# Patient Record
Sex: FEMALE | Race: OTHER | HISPANIC OR LATINO | URBAN - METROPOLITAN AREA
[De-identification: names, ages, dates, MRNs, and addresses within clinical notes are randomized per-mention and may not be internally consistent; named-entity substitution may affect disease eponyms.]

---

## 2023-05-08 ENCOUNTER — OFFICE (OUTPATIENT)
Dept: URBAN - METROPOLITAN AREA CLINIC 12 | Facility: CLINIC | Age: 68
Setting detail: OPHTHALMOLOGY
End: 2023-05-08
Payer: COMMERCIAL

## 2023-05-08 DIAGNOSIS — Z96.1: ICD-10-CM

## 2023-05-08 DIAGNOSIS — H26.493: ICD-10-CM

## 2023-05-08 DIAGNOSIS — H40.033: ICD-10-CM

## 2023-05-08 DIAGNOSIS — H43.393: ICD-10-CM

## 2023-05-08 DIAGNOSIS — E11.9: ICD-10-CM

## 2023-05-08 DIAGNOSIS — H18.413: ICD-10-CM

## 2023-05-08 DIAGNOSIS — H16.223: ICD-10-CM

## 2023-05-08 PROCEDURE — 92250 FUNDUS PHOTOGRAPHY W/I&R: CPT | Performed by: OPHTHALMOLOGY

## 2023-05-08 PROCEDURE — 99214 OFFICE O/P EST MOD 30 MIN: CPT | Performed by: OPHTHALMOLOGY

## 2023-05-08 ASSESSMENT — CONFRONTATIONAL VISUAL FIELD TEST (CVF)
OD_FINDINGS: FULL
OS_FINDINGS: FULL

## 2023-05-08 ASSESSMENT — TONOMETRY
OD_IOP_MMHG: 18
OS_IOP_MMHG: 17

## 2023-05-08 ASSESSMENT — SUPERFICIAL PUNCTATE KERATITIS (SPK)
OS_SPK: 1+ 2+
OD_SPK: 1+ 2+

## 2023-05-09 ASSESSMENT — REFRACTION_MANIFEST
OS_ADD: +2.50
OD_VA1: 20/20
OD_AXIS: 060
OS_CYLINDER: -0.50
OS_CYLINDER: -0.75
OD_ADD: +2.50
OS_AXIS: 125
OS_VA1: 20/25-2
OS_VA1: 20/20
OS_ADD: +2.50
OD_CYLINDER: -0.50
OD_AXIS: 080
OD_SPHERE: +0.50
OS_SPHERE: PL
OD_SPHERE: +0.50
OS_AXIS: 145
OD_ADD: +2.50
OD_CYLINDER: -0.50
OS_SPHERE: +0.25

## 2023-05-09 ASSESSMENT — REFRACTION_CURRENTRX
OS_AXIS: 139
OD_SPHERE: +0.50
OD_OVR_VA: 20/
OS_CYLINDER: -1.00
OS_VPRISM_DIRECTION: PROGS
OD_CYLINDER: -0.50
OD_VPRISM_DIRECTION: PROGS
OS_ADD: +2.50
OD_ADD: +2.50
OS_SPHERE: PLANO
OS_OVR_VA: 20/
OD_AXIS: 065

## 2023-05-09 ASSESSMENT — KERATOMETRY
OD_K1POWER_DIOPTERS: 43.50
OD_K2POWER_DIOPTERS: 43.75
OS_AXISANGLE_DEGREES: 077
OD_AXISANGLE_DEGREES: 114
OS_K2POWER_DIOPTERS: 44.00
OS_K1POWER_DIOPTERS: 43.25
METHOD_AUTO_MANUAL: AUTO

## 2023-05-09 ASSESSMENT — VISUAL ACUITY
OS_BCVA: 20/25
OD_BCVA: 20/30-1

## 2023-05-09 ASSESSMENT — SPHEQUIV_DERIVED
OD_SPHEQUIV: 0.375
OS_SPHEQUIV: 0
OD_SPHEQUIV: 0.25
OS_SPHEQUIV: 0.25
OD_SPHEQUIV: 0.25

## 2023-05-09 ASSESSMENT — REFRACTION_AUTOREFRACTION
OD_SPHERE: +0.75
OS_CYLINDER: -0.50
OD_AXIS: 081
OS_SPHERE: +0.50
OD_CYLINDER: -0.75
OS_AXIS: 126

## 2023-05-09 ASSESSMENT — AXIALLENGTH_DERIVED
OD_AL: 23.4495
OD_AL: 23.4014
OS_AL: 23.4495
OD_AL: 23.4495
OS_AL: 23.5461

## 2023-06-02 ENCOUNTER — ASC (OUTPATIENT)
Dept: URBAN - METROPOLITAN AREA SURGERY 8 | Facility: SURGERY | Age: 68
Setting detail: OPHTHALMOLOGY
End: 2023-06-02
Payer: COMMERCIAL

## 2023-06-02 DIAGNOSIS — H26.492: ICD-10-CM

## 2023-06-02 PROCEDURE — 66821 AFTER CATARACT LASER SURGERY: CPT | Performed by: OPHTHALMOLOGY

## 2023-06-02 ASSESSMENT — REFRACTION_MANIFEST
OD_ADD: +2.50
OD_AXIS: 060
OS_ADD: +2.50
OS_AXIS: 145
OD_VA1: 20/20
OD_CYLINDER: -0.50
OD_ADD: +2.50
OD_SPHERE: +0.50
OS_CYLINDER: -0.75
OS_SPHERE: +0.25
OD_SPHERE: +0.50
OS_SPHERE: PL
OS_VA1: 20/25-2
OS_AXIS: 125
OS_VA1: 20/20
OS_CYLINDER: -0.50
OD_CYLINDER: -0.50
OD_AXIS: 080
OS_ADD: +2.50

## 2023-06-02 ASSESSMENT — REFRACTION_CURRENTRX
OD_VPRISM_DIRECTION: PROGS
OS_CYLINDER: -1.00
OD_SPHERE: +0.50
OS_VPRISM_DIRECTION: PROGS
OS_ADD: +2.50
OD_OVR_VA: 20/
OS_OVR_VA: 20/
OD_CYLINDER: -0.50
OS_AXIS: 139
OD_ADD: +2.50
OD_AXIS: 065
OS_SPHERE: PLANO

## 2023-06-02 ASSESSMENT — REFRACTION_AUTOREFRACTION
OD_AXIS: 081
OS_SPHERE: +0.50
OS_AXIS: 126
OS_CYLINDER: -0.50
OD_CYLINDER: -0.75
OD_SPHERE: +0.75

## 2023-06-02 ASSESSMENT — KERATOMETRY
OS_K2POWER_DIOPTERS: 44.00
OD_K1POWER_DIOPTERS: 43.50
OS_AXISANGLE_DEGREES: 077
OD_AXISANGLE_DEGREES: 114
METHOD_AUTO_MANUAL: AUTO
OD_K2POWER_DIOPTERS: 43.75
OS_K1POWER_DIOPTERS: 43.25

## 2023-06-02 ASSESSMENT — SPHEQUIV_DERIVED
OS_SPHEQUIV: 0.25
OD_SPHEQUIV: 0.25
OD_SPHEQUIV: 0.375
OS_SPHEQUIV: 0
OD_SPHEQUIV: 0.25

## 2023-06-02 ASSESSMENT — AXIALLENGTH_DERIVED
OS_AL: 23.4495
OD_AL: 23.4495
OD_AL: 23.4495
OD_AL: 23.4014
OS_AL: 23.5461

## 2023-06-02 ASSESSMENT — VISUAL ACUITY
OD_BCVA: 20/30-1
OS_BCVA: 20/25

## 2023-06-02 ASSESSMENT — SUPERFICIAL PUNCTATE KERATITIS (SPK)
OS_SPK: 1+ 2+
OD_SPK: 1+ 2+

## 2023-06-02 ASSESSMENT — CONFRONTATIONAL VISUAL FIELD TEST (CVF)
OD_FINDINGS: FULL
OS_FINDINGS: FULL

## 2023-06-16 ENCOUNTER — AMBULATORY SURGERY (OUTPATIENT)
Dept: URBAN - METROPOLITAN AREA SURGERY 2 | Facility: SURGERY | Age: 68
Setting detail: OPHTHALMOLOGY
End: 2023-06-16
Payer: COMMERCIAL

## 2023-06-16 ENCOUNTER — RX ONLY (RX ONLY)
Age: 68
End: 2023-06-16

## 2023-06-16 DIAGNOSIS — H26.491: ICD-10-CM

## 2023-06-16 PROCEDURE — 66821 AFTER CATARACT LASER SURGERY: CPT | Performed by: OPHTHALMOLOGY

## 2023-06-16 ASSESSMENT — REFRACTION_AUTOREFRACTION
OD_CYLINDER: -0.75
OS_AXIS: 126
OS_SPHERE: +0.50
OS_CYLINDER: -0.50
OD_AXIS: 081
OD_SPHERE: +0.75

## 2023-06-16 ASSESSMENT — REFRACTION_MANIFEST
OS_ADD: +2.50
OS_SPHERE: PL
OS_VA1: 20/25-2
OD_SPHERE: +0.50
OD_ADD: +2.50
OD_SPHERE: +0.50
OS_CYLINDER: -0.50
OD_AXIS: 080
OS_SPHERE: +0.25
OS_VA1: 20/20
OD_CYLINDER: -0.50
OD_VA1: 20/20
OS_AXIS: 125
OS_CYLINDER: -0.75
OS_ADD: +2.50
OD_CYLINDER: -0.50
OD_ADD: +2.50
OS_AXIS: 145
OD_AXIS: 060

## 2023-06-16 ASSESSMENT — REFRACTION_CURRENTRX
OD_CYLINDER: -0.50
OS_OVR_VA: 20/
OS_ADD: +2.50
OD_OVR_VA: 20/
OS_CYLINDER: -1.00
OD_VPRISM_DIRECTION: PROGS
OD_SPHERE: +0.50
OS_SPHERE: PLANO
OD_ADD: +2.50
OD_AXIS: 065
OS_AXIS: 139
OS_VPRISM_DIRECTION: PROGS

## 2023-06-16 ASSESSMENT — CONFRONTATIONAL VISUAL FIELD TEST (CVF)
OD_FINDINGS: FULL
OS_FINDINGS: FULL

## 2023-06-16 ASSESSMENT — SPHEQUIV_DERIVED
OS_SPHEQUIV: 0.25
OD_SPHEQUIV: 0.25
OD_SPHEQUIV: 0.375
OS_SPHEQUIV: 0
OD_SPHEQUIV: 0.25

## 2023-06-16 ASSESSMENT — KERATOMETRY
OS_K1POWER_DIOPTERS: 43.25
OS_K2POWER_DIOPTERS: 44.00
OD_AXISANGLE_DEGREES: 114
OS_AXISANGLE_DEGREES: 077
METHOD_AUTO_MANUAL: AUTO
OD_K1POWER_DIOPTERS: 43.50
OD_K2POWER_DIOPTERS: 43.75

## 2023-06-16 ASSESSMENT — AXIALLENGTH_DERIVED
OD_AL: 23.4014
OS_AL: 23.5461
OD_AL: 23.4495
OD_AL: 23.4495
OS_AL: 23.4495

## 2023-06-16 ASSESSMENT — VISUAL ACUITY
OD_BCVA: 20/30-1
OS_BCVA: 20/25

## 2023-06-16 ASSESSMENT — SUPERFICIAL PUNCTATE KERATITIS (SPK)
OS_SPK: 1+ 2+
OD_SPK: 1+ 2+

## 2023-07-03 ENCOUNTER — OFFICE (OUTPATIENT)
Dept: URBAN - METROPOLITAN AREA CLINIC 12 | Facility: CLINIC | Age: 68
Setting detail: OPHTHALMOLOGY
End: 2023-07-03
Payer: COMMERCIAL

## 2023-07-03 DIAGNOSIS — H26.491: ICD-10-CM

## 2023-07-03 PROBLEM — H26.493 POSTERIOR CAPSULAR OPACIFICATION; BOTH EYES: Status: ACTIVE | Noted: 2023-07-03

## 2023-07-03 PROCEDURE — 99024 POSTOP FOLLOW-UP VISIT: CPT | Performed by: OPTOMETRIST

## 2023-07-03 ASSESSMENT — REFRACTION_MANIFEST
OS_AXIS: 145
OS_AXIS: 125
OD_ADD: +2.50
OS_ADD: +2.50
OS_SPHERE: +0.25
OD_CYLINDER: -0.50
OS_CYLINDER: -0.75
OD_CYLINDER: -0.50
OD_SPHERE: +0.50
OS_VA1: 20/25-2
OD_AXIS: 060
OS_SPHERE: PL
OD_AXIS: 080
OD_SPHERE: +0.50
OD_VA1: 20/20
OS_CYLINDER: -0.50
OS_VA1: 20/20
OS_ADD: +2.50
OD_ADD: +2.50

## 2023-07-03 ASSESSMENT — REFRACTION_AUTOREFRACTION
OS_AXIS: 121
OS_CYLINDER: -0.50
OD_AXIS: 69
OD_CYLINDER: -0.75
OS_SPHERE: +0.75
OD_SPHERE: +0.50

## 2023-07-03 ASSESSMENT — KERATOMETRY
OS_K1POWER_DIOPTERS: 43.25
OD_AXISANGLE_DEGREES: 121
OD_K1POWER_DIOPTERS: 43.50
OD_K2POWER_DIOPTERS: 44.75
OS_AXISANGLE_DEGREES: 62
METHOD_AUTO_MANUAL: AUTO
OS_K2POWER_DIOPTERS: 44.00

## 2023-07-03 ASSESSMENT — AXIALLENGTH_DERIVED
OD_AL: 23.2694
OS_AL: 23.5461
OD_AL: 23.2694
OD_AL: 23.3169
OS_AL: 23.3536

## 2023-07-03 ASSESSMENT — SUPERFICIAL PUNCTATE KERATITIS (SPK)
OS_SPK: 1+ 2+
OD_SPK: 1+ 2+

## 2023-07-03 ASSESSMENT — REFRACTION_CURRENTRX
OD_VPRISM_DIRECTION: PROGS
OS_AXIS: 139
OS_SPHERE: PLANO
OD_OVR_VA: 20/
OD_CYLINDER: -0.50
OD_SPHERE: +0.50
OS_OVR_VA: 20/
OD_AXIS: 065
OD_ADD: +2.50
OS_ADD: +2.50
OS_CYLINDER: -1.00
OS_VPRISM_DIRECTION: PROGS

## 2023-07-03 ASSESSMENT — CONFRONTATIONAL VISUAL FIELD TEST (CVF)
OD_FINDINGS: FULL
OS_FINDINGS: FULL

## 2023-07-03 ASSESSMENT — SPHEQUIV_DERIVED
OS_SPHEQUIV: 0.5
OD_SPHEQUIV: 0.125
OD_SPHEQUIV: 0.25
OD_SPHEQUIV: 0.25
OS_SPHEQUIV: 0

## 2023-07-03 ASSESSMENT — VISUAL ACUITY
OD_BCVA: 20/20-1
OS_BCVA: 20/20-1

## 2023-07-03 ASSESSMENT — TONOMETRY
OS_IOP_MMHG: 16
OD_IOP_MMHG: 17

## 2024-04-29 PROBLEM — Z00.00 ENCOUNTER FOR PREVENTIVE HEALTH EXAMINATION: Status: ACTIVE | Noted: 2024-04-29

## 2024-05-01 ENCOUNTER — APPOINTMENT (OUTPATIENT)
Dept: ORTHOPEDIC SURGERY | Facility: CLINIC | Age: 69
End: 2024-05-01
Payer: MEDICARE

## 2024-05-01 DIAGNOSIS — Z86.39 PERSONAL HISTORY OF OTHER ENDOCRINE, NUTRITIONAL AND METABOLIC DISEASE: ICD-10-CM

## 2024-05-01 DIAGNOSIS — I10 ESSENTIAL (PRIMARY) HYPERTENSION: ICD-10-CM

## 2024-05-01 DIAGNOSIS — Z78.9 OTHER SPECIFIED HEALTH STATUS: ICD-10-CM

## 2024-05-01 DIAGNOSIS — M16.11 UNILATERAL PRIMARY OSTEOARTHRITIS, RIGHT HIP: ICD-10-CM

## 2024-05-01 PROCEDURE — 99204 OFFICE O/P NEW MOD 45 MIN: CPT

## 2024-05-01 NOTE — PHYSICAL EXAM
[Right] : right hip with pelvis [Outside films reviewed] : Outside films reviewed [Severe arthritis (Tonnis Grade 3)] : Severe arthritis (Tonnis Grade 3) [de-identified] : Constitutional: The patient appears well developed, well nourished. Examination of patients ability to communicate functionally was normal.       Neurologic: Coordination is normal. Alert and oriented to time, place and person. No evidence of mood disorder, calm affect.       RIGHT      HIP/THIGH : Inspection of the hip/thigh is as follows: Inspection shows no swelling, no ecchymosis, no erythema, no rashes and no masses.       Palpation of the hip/thigh is as follows: groin tenderness and GREATER TROCH TTP . no palpable defects and no palpable masses.       Range of motion of the hip is as follows in degrees:       Flexion: 100     Abduction:  20     External rotation:  30    Internal rotaion:  5    pAIN ELICITED INTO THE GROIN WITH ROM HIP  Stength testing of the hip/thigh is as follows:   Hip flexion strength:   5/5    Hip extension strength:  5/5    Hip abductionstrength:  5/5   Hip adductionstrength:  5/5      Neurological testing of the hip/thigh is as follows: motor exam 5/5 throughout, light touch intact throughout and no focal motor defecits.       Gait and function is as follows: mildly antalgic gait.         [FreeTextEntry9] : ap/lat ZP show severe inferior joint space narrowing and spurring femoral head.

## 2024-05-01 NOTE — HISTORY OF PRESENT ILLNESS
[de-identified] : Patient is here today for the right hip.  Patient c/o Right groin pain and low back pain and lateral hip pain for mos.  She had seen Dr Wells and minal for her back and had injections/trx and then started to having groin pain and had xrays and MRI. She had CSI IA Right hip 2 mos ago which helped for 2 weeks.  She notes pain with sudden movements and sleeping at night.

## 2024-05-01 NOTE — DISCUSSION/SUMMARY
[de-identified] : Options were discussed today including oral anti-inflammatories, Physical Therapy, Steroid Injection, Hyalgan injections or Surgery. The patient had time to ask questions of the different treatment options, including doing nothing and just observing for a finite period of time and re-evaluating in the future.  The Risks, benefits, alternatives and expectations of the proposed procedure, as well as non-operative management, were discussed at length with the patient, as well as the procedure itself and the expected recovery period. The possible need for post operative Physical Therapy was also discussed. The patient was allowed as much time as necessary to ask all appropriate questions and they were answered to the patient's satisfaction.  Risks involving the MAICOL were discussed and include infection, bleeding, anesthesia complications, NV injury, fracture, leg length inequality, dislocation, DVT, or heterotopic ossification   A discussion was completed with the patient regarding the type of implant that is planned, including what the implant is made of.  The possibility of allergy was discussed.  The different methods of implant fixation, as well as the expected longevity of the implant was also discussed.  The bone model was used, as well as the Total Joint pamphlet.  There was amble time to address all of the patient's questions and concerns.  Patient understands if cortisone injection is administered, total joint arthroplasty will need to be delayed 4 months. She will have to wait approx 2 more mos if she wants to have MAICOL. She will go to PT at this time and f/u in 2 mos.  Her last A1C was under 7.

## 2024-07-24 ENCOUNTER — APPOINTMENT (OUTPATIENT)
Dept: ORTHOPEDIC SURGERY | Facility: CLINIC | Age: 69
End: 2024-07-24
Payer: MEDICARE

## 2024-07-24 VITALS — BODY MASS INDEX: 29.45 KG/M2 | WEIGHT: 156 LBS | HEIGHT: 61 IN

## 2024-07-24 DIAGNOSIS — M16.11 UNILATERAL PRIMARY OSTEOARTHRITIS, RIGHT HIP: ICD-10-CM

## 2024-07-24 PROCEDURE — 99214 OFFICE O/P EST MOD 30 MIN: CPT

## 2024-07-24 NOTE — HISTORY OF PRESENT ILLNESS
[de-identified] : Follow up on right hip. Patient has been going to physical therapy 1x a week.  She states the hip has been feeling better.  She is able to tolerate more activity.  She is satisfied with results at this point.

## 2024-07-24 NOTE — PHYSICAL EXAM
[Right] : right hip with pelvis [Outside films reviewed] : Outside films reviewed [Severe arthritis (Tonnis Grade 3)] : Severe arthritis (Tonnis Grade 3) [de-identified] : Constitutional: The patient appears well developed, well nourished. Examination of patients ability to communicate functionally was normal.       Neurologic: Coordination is normal. Alert and oriented to time, place and person. No evidence of mood disorder, calm affect.       RIGHT      HIP/THIGH : Inspection of the hip/thigh is as follows: Inspection shows no swelling, no ecchymosis, no erythema, no rashes and no masses.       Palpation of the hip/thigh is as follows: groin tenderness and GREATER TROCH TTP . no palpable defects and no palpable masses.       Range of motion of the hip is as follows in degrees:       Flexion: 100     Abduction:  20     External rotation:  30    Internal rotaion:  5    pAIN ELICITED INTO THE GROIN WITH ROM HIP  Stength testing of the hip/thigh is as follows:   Hip flexion strength:   5/5    Hip extension strength:  5/5    Hip abductionstrength:  5/5   Hip adductionstrength:  5/5      Neurological testing of the hip/thigh is as follows: motor exam 5/5 throughout, light touch intact throughout and no focal motor defecits.       Gait and function is as follows: mildly antalgic gait.         [FreeTextEntry9] : ap/lat ZP show severe inferior joint space narrowing and spurring femoral head.

## 2024-07-24 NOTE — DISCUSSION/SUMMARY
[de-identified] : Extensive discussion of the options was had with the patient. This discussion included both surgical and nonsurgical options. Options including but not limited to cortisone injection, visco-supplementation, physical therapy, prescription oral anti-inflammatories, MRI, arthroplasty VS arthroscopy were discussed with the patient. We also discussed the option of observation, allowing the patient to continue rest, ice, prescription drug NSAIDs and following up if the condition does not improve. Time was taken to go over any questions the patient had in regard to any of the treatment plans described.  Significant amount of time was taken to review imaging with the patient today. The patient reports she goes to PT 1x a week for her right hip. The patient reports she will continue PT. The patient reports she did not need a new PT script today. The patient reports she had a CSI in the right hip in the past. The patient reports her pain in her right hip is not significant enough to do another CSI at this point.  At this time the plan that the patient wishes to move forward with is to observe and continue self care including but not limited to HEP, Ice, NSAIDs and rest as needed.   Patient was instructed to f/u if their symptoms do not improve or if there are any further concerns.   Entered by Jackie Joya acting as scribe. Dr. Trevizo Attestation The documentation recorded by the scribe, in my presence, accurately reflects the service I, Dr. Trevizo, personally performed, and the decisions made by me with my edits as appropriate.

## 2025-01-28 ENCOUNTER — NON-APPOINTMENT (OUTPATIENT)
Age: 70
End: 2025-01-28

## 2025-01-28 ENCOUNTER — APPOINTMENT (OUTPATIENT)
Dept: OPHTHALMOLOGY | Facility: CLINIC | Age: 70
End: 2025-01-28
Payer: MEDICARE

## 2025-01-28 PROCEDURE — 92004 COMPRE OPH EXAM NEW PT 1/>: CPT

## 2025-02-12 ENCOUNTER — NON-APPOINTMENT (OUTPATIENT)
Age: 70
End: 2025-02-12

## 2025-02-12 ENCOUNTER — APPOINTMENT (OUTPATIENT)
Dept: OPHTHALMOLOGY | Facility: CLINIC | Age: 70
End: 2025-02-12
Payer: COMMERCIAL

## 2025-02-12 PROCEDURE — 92004 COMPRE OPH EXAM NEW PT 1/>: CPT

## 2025-02-12 PROCEDURE — 92015 DETERMINE REFRACTIVE STATE: CPT
